# Patient Record
Sex: MALE | Race: WHITE | URBAN - METROPOLITAN AREA
[De-identification: names, ages, dates, MRNs, and addresses within clinical notes are randomized per-mention and may not be internally consistent; named-entity substitution may affect disease eponyms.]

---

## 2017-08-16 ENCOUNTER — OFFICE VISIT (OUTPATIENT)
Dept: FAMILY MEDICINE | Facility: CLINIC | Age: 32
End: 2017-08-16
Payer: COMMERCIAL

## 2017-08-16 VITALS — TEMPERATURE: 98.1 F | DIASTOLIC BLOOD PRESSURE: 60 MMHG | SYSTOLIC BLOOD PRESSURE: 100 MMHG

## 2017-08-16 DIAGNOSIS — Z71.84 TRAVEL ADVICE ENCOUNTER: Primary | ICD-10-CM

## 2017-08-16 DIAGNOSIS — Z23 NEED FOR VACCINATION: ICD-10-CM

## 2017-08-16 PROCEDURE — 90471 IMMUNIZATION ADMIN: CPT | Mod: GA | Performed by: NURSE PRACTITIONER

## 2017-08-16 PROCEDURE — 90632 HEPA VACCINE ADULT IM: CPT | Mod: GA | Performed by: NURSE PRACTITIONER

## 2017-08-16 PROCEDURE — 99402 PREV MED CNSL INDIV APPRX 30: CPT | Mod: 25 | Performed by: NURSE PRACTITIONER

## 2017-08-16 PROCEDURE — 90472 IMMUNIZATION ADMIN EACH ADD: CPT | Mod: GA | Performed by: NURSE PRACTITIONER

## 2017-08-16 PROCEDURE — 90717 YELLOW FEVER VACCINE SUBQ: CPT | Mod: GA | Performed by: NURSE PRACTITIONER

## 2017-08-16 RX ORDER — AZITHROMYCIN 500 MG/1
500 TABLET, FILM COATED ORAL DAILY
Qty: 3 TABLET | Refills: 0 | Status: SHIPPED | OUTPATIENT
Start: 2017-08-16 | End: 2017-08-19

## 2017-08-16 RX ORDER — ACETAZOLAMIDE 125 MG/1
TABLET ORAL
Qty: 20 TABLET | Refills: 0 | Status: SHIPPED | OUTPATIENT
Start: 2017-08-16

## 2017-08-16 RX ORDER — ATOVAQUONE AND PROGUANIL HYDROCHLORIDE 250; 100 MG/1; MG/1
1 TABLET, FILM COATED ORAL DAILY
Qty: 70 TABLET | Refills: 0 | Status: SHIPPED | OUTPATIENT
Start: 2017-08-16

## 2017-08-16 NOTE — PATIENT INSTRUCTIONS
Today August 16, 2017 you received the    Hepatitis A Vaccine -    Yellow Fever (YF)    Typhoid - Oral. This prescription has been faxed to your pharmacy, please take as directed.   .    These appointments can be made as a NURSE ONLY visit.    **It is very important for the vaccinations to be given on the scheduled day(s), this helps ensure you receive the full effectiveness of the vaccine.**    Please call [facility] with any questions [phone]    Thank you for visiting [facility]

## 2017-08-16 NOTE — NURSING NOTE
"Chief Complaint   Patient presents with     Travel Clinic     initial /60  Temp 98.1  F (36.7  C) (Oral) Estimated body mass index is 22.54 kg/(m^2) as calculated from the following:    Height as of 12/27/16: 5' 7.25\" (1.708 m).    Weight as of 12/27/16: 145 lb (65.8 kg).  BP completed using cuff size: regular.  L  arm      Health Maintenance that is potentially due pending provider review:  NONE    n/a    Logan Roman ma  "

## 2017-08-16 NOTE — MR AVS SNAPSHOT
After Visit Summary   8/16/2017    Edgard Morales    MRN: [id number]           Patient Information     Date Of Birth          1985        Visit Information        Provider Department      8/16/2017 12:00 PM Zuly Sanchez APRN [facility]        Today's Diagnoses     Travel advice encounter    -  1    Need for vaccination          Care Instructions    Today August 16, 2017 you received the    Hepatitis A Vaccine -    Yellow Fever (YF)    Typhoid - Oral. This prescription has been faxed to your pharmacy, please take as directed.   .    These appointments can be made as a NURSE ONLY visit.    **It is very important for the vaccinations to be given on the scheduled day(s), this helps ensure you receive the full effectiveness of the vaccine.**    Please call [facility] with any questions [phone]    Thank you for visiting [facility]              Follow-ups after your visit        Who to contact     If you have questions or need follow up information about today's clinic visit or your schedule please contact [facility] directly at [phone].  Normal or non-critical lab and imaging results will be communicated to you by MyChart, letter or phone within 4 business days after the clinic has received the results. If you do not hear from us within 7 days, please contact the clinic through [employer] or phone. If you have a critical or abnormal lab result, we will notify you by phone as soon as possible.  Submit refill requests through [employer] or call your pharmacy and they will forward the refill request to us. Please allow 3 business days for your refill to be completed.          Additional Information About Your Visit        MyChart Information     [employer] lets you send messages to your doctor, view your test results, renew your prescriptions, schedule appointments and more. To sign up, go to [email or web address] . Click on  "\"Log in\" on the left side of the screen, which will take you to the Welcome page. Then click on \"Sign up Now\" on the right side of the page.     You will be asked to enter the access code listed below, as well as some personal information. Please follow the directions to create your username and password.     Your access code is: [email or web address]  Expires: 2017 12:39 PM     Your access code will  in 90 days. If you need help or a new code, please call your [place] clinic or [phone].        Care EveryWhere ID     This is your Care EveryWhere ID. This could be used by other organizations to access your [place] medical records  [id number]        Your Vitals Were     Temperature                   98.1  F (36.7  C) (Oral)            Blood Pressure from Last 3 Encounters:   17 100/60   16 90/64   05/29/15 100/56    Weight from Last 3 Encounters:   16 145 lb (65.8 kg)   05/29/15 147 lb 6.4 oz (66.9 kg)              We Performed the Following     C YELLOW FEVER IMMUNIZATION, LIVE, SQ (STAMARIL)     HEPA VACCINE ADULT IM          Today's Medication Changes          These changes are accurate as of: 17 12:45 PM.  If you have any questions, ask your nurse or doctor.               Start taking these medicines.        Dose/Directions    acetaZOLAMIDE 125 MG tablet   Commonly known as:  DIAMOX   Used for:  Travel advice encounter   Started by:  Zuly Sanchez APRN CNP        To prevent altitude illness: Take one tab every 12 hours starting 24 hours prior to ascent and continue for 48 hours at highest   Quantity:  20 tablet   Refills:  0       atovaquone-proguanil 250-100 MG per tablet   Commonly known as:  MALARONE   Used for:  Travel advice encounter   Started by:  Zuly Sanchez APRN CNP        Dose:  1 tablet   Take 1 tablet by mouth daily Start 2 days before exposure to Malaria and continue daily till  7 days after exposure.   Quantity:  70 tablet   Refills:  0       azithromycin " 500 MG tablet   Commonly known as:  ZITHROMAX   Used for:  Travel advice encounter   Started by:  Zuly Sanchez APRN CNP        Dose:  500 mg   Take 1 tablet (500 mg) by mouth daily for 3 doses Take 1 tablet a day for up to 3 days for severe diarrhea   Quantity:  3 tablet   Refills:  0       typhoid CR capsule   Commonly known as:  VIVOTIF   Used for:  Travel advice encounter   Started by:  Zuly Sanchez APRN CNP        Dose:  1 capsule   Take 1 capsule by mouth every other day   Quantity:  4 capsule   Refills:  0            Where to get your medicines      These medications were sent to [id number] - [place], [place] - [address]  [address], [facility] [address]     Phone:  [phone]     acetaZOLAMIDE 125 MG tablet    atovaquone-proguanil 250-100 MG per tablet    azithromycin 500 MG tablet    typhoid CR capsule                Primary Care Provider    None       No address on file        Equal Access to Services     [facility] : Hadii sadie kingsleyo Jaqueline, waaxda luqadaha, qaybta kaalmada adephillipyacandelario, rosalina shah . So [facility] [phone].    ATENCIÓN: Si habla español, tiene a reina disposición servicios gratuitos de asistencia lingüística. Radha al [phone].    We comply with applicable federal civil rights laws and [place] laws. We do not discriminate on the basis of race, color, national origin, age, disability sex, sexual orientation or gender identity.            Thank you!     Thank you for choosing [facility]  for your care. Our goal is always to provide you with excellent care. Hearing back from our patients is one way we can continue to improve our services. Please take a few minutes to complete the written survey that you may receive in the mail after your visit with us. Thank you!             Your Updated Medication List - Protect others around you: Learn how to safely use, store and throw away your medicines at  www.disposemymeds.org.          This list is accurate as of: 8/16/17 12:45 PM.  Always use your most recent med list.                   Brand Name Dispense Instructions for use Diagnosis    acetaZOLAMIDE 125 MG tablet    DIAMOX    20 tablet    To prevent altitude illness: Take one tab every 12 hours starting 24 hours prior to ascent and continue for 48 hours at highest    Travel advice encounter       atovaquone-proguanil 250-100 MG per tablet    MALARONE    70 tablet    Take 1 tablet by mouth daily Start 2 days before exposure to Malaria and continue daily till  7 days after exposure.    Travel advice encounter       azithromycin 500 MG tablet    ZITHROMAX    3 tablet    Take 1 tablet (500 mg) by mouth daily for 3 doses Take 1 tablet a day for up to 3 days for severe diarrhea    Travel advice encounter       CABERGOLINE PO      Take 2 mg by mouth twice a week        ciprofloxacin 500 MG tablet    CIPRO    6 tablet    Take 1 tablet (500 mg) by mouth 2 times daily    Encounter for routine adult medical exam with abnormal findings       IBUPROFEN PO      Take 200 mg by mouth as needed for moderate pain        MULTIVITAMIN PO      Take 1 tablet by mouth daily        triamcinolone 0.1 % cream    KENALOG    15 g    Apply sparingly to affected area three times daily for 14 days.    Eczema       typhoid CR capsule    VIVOTIF    4 capsule    Take 1 capsule by mouth every other day    Travel advice encounter

## 2017-08-16 NOTE — PROGRESS NOTES
Nurse Note      Itinerary:  [place]      Departure Date: 9/15/17      Return Date: 11/15/17      Length of Trip 2 months      Reason for Travel: Tourism           Urban or rural: both      Accommodations: Hotel        IMMUNIZATION HISTORY  Have you received any immunizations within the past 4 weeks?  No  Have you ever fainted from having your blood drawn or from an injection?  No  Have you ever had a fever reaction to vaccination?  No  Have you ever had any bad reaction or side effect from any vaccination?  No  Have you ever had hepatitis A or B vaccine?  No  Do you live (or work closely) with anyone who has AIDS, an AIDS-like condition, any other immune disorder or who is on chemotherapy for cancer?  No  Do you have a family history of immunodeficiency?  No  Have you received any injection of immune globulin or any blood products during the past 12 months?  No    Patient roomed by Logan Monroy  Edgard Morales is a 31 year old male seen today alone for counsultation for [place] travel [place].  Patient will be departing in  1 month(s) and staying for   2 month(s) and  traveling with spouse.      Patient itinerary :  Open Itinerary: will begin in [place] and drive/bus south through [place] including, [place], [place], [place], [place], [place], [place] and [place]  of  which presents risk for Malaria, Yellow Fever, Dengue Fever, Chikungungya, Zika,  Trypanosomiasis, Schistosomiasis, Rabies, food borne illnesses, motor vehicle accidents, Typhoid, Leishmaniasis and zika. exposure.      Patient's activities will include sightseeing, camping, hiking, beach activities (salt water), scuba diving, high altitude exposure and travel by car or other vehicle.    Patient's country of birth is [place]    Special medical concerns: none  Pre-travel questionnaire was completed by patient and reviewed by provider.     Vitals: /60  Temp  98.1  F (36.7  C) (Oral)  BMI= There is no height or weight on file to calculate BMI.    EXAM:  General:  Well-nourished, well-developed in no acute distress.  Appears to be stated age, interacts appropriately and expresses understanding of information given to patient.    Current Outpatient Prescriptions   Medication Sig Dispense Refill     typhoid (VIVOTIF) CR capsule Take 1 capsule by mouth every other day 4 capsule 0     atovaquone-proguanil (MALARONE) 250-100 MG per tablet Take 1 tablet by mouth daily Start 2 days before exposure to Malaria and continue daily till  7 days after exposure. 70 tablet 0     azithromycin (ZITHROMAX) 500 MG tablet Take 1 tablet (500 mg) by mouth daily for 3 doses Take 1 tablet a day for up to 3 days for severe diarrhea 3 tablet 0     acetaZOLAMIDE (DIAMOX) 125 MG tablet To prevent altitude illness: Take one tab every 12 hours starting 24 hours prior to ascent and continue for 48 hours at highest 20 tablet 0     ciprofloxacin (CIPRO) 500 MG tablet Take 1 tablet (500 mg) by mouth 2 times daily 6 tablet 0     CABERGOLINE PO Take 2 mg by mouth twice a week       IBUPROFEN PO Take 200 mg by mouth as needed for moderate pain       Multiple Vitamins-Minerals (MULTIVITAMIN PO) Take 1 tablet by mouth daily       triamcinolone (KENALOG) 0.1 % cream Apply sparingly to affected area three times daily for 14 days. 15 g 0     Patient Active Problem List   Diagnosis     Prolactinoma (H)     Allergies   Allergen Reactions     Tylenol W/Codeine [Acetaminophen-Codeine]          Immunizations discussed include:   Hepatitis A:  Ordered/given today, risks, benefits and side effects reviewed  Hepatitis B: advised patient to check records  Influenza: vaccine is not available  Typhoid: Oral Typhoid (Vivotiff) Rx sent to pharmacy  Rabies: Declined  Cost  reviewed managment of a animal bite or scratch (washing wound, seek medical care within 24 hours for post exposure prophylaxis )  Yellow Fever: Stamaril  Ordered/given today - consent completed, side effects, precautions, allergies, risks discussed. Patient expressed understanding.  Palestinian Encephalitis: Not indicated  Meningococcus: Not indicated  Tetanus/Diphtheria: Up to date  Measles/Mumps/Rubella: Up to date  Cholera: Not needed  Polio: Up to date  Pneumococcal: Under age of 65  Varicella: Immune by disease history per patient report  Zostavax:  Not indicated  HPV:  Not indicated  TB:  Low risk    Stamaril Informed Consent    The patient was provided with a copy of the IRB-approved consent form and all questions were answered before the patient agreed to participate by signing the informed consent document.   A copy of the form was provided to the patient.    Date: 08/16/2017  Consent Version Date: 05/10/2017  Consent Obtained by:  KRISTY Sanchez NO  HIPAA:  Yes  HIPAA Authorization Signed Date: 08/16/2017      Inclusion/Exclusion Criteria:    (Similar to Yellow Fever-VAX)      The patient met all of the following inclusion criteria in order to be eligible for the Stamaril vaccination under this EAP (Expanded Access Investigational New Drug Program)           At increased risk for YF, including researchers, laboratory workers, vaccine production staff, and those who are traveling within 30 days to a YF-endemic region or to a country requiring proof of YF vaccination under IHRs (International Health Regulations)?       Yes     Patient is greater than or equal to 9 months of age on the day of vaccination?     Yes     Patient is greater than or equal to 18 years of age and signed and dated the Consent Forms?     Yes     Patient is < 18 years of age and parent(s)/guardian(s) signed and dated the Consent Forms?      Patient is 7 years to < 18 years of age and signed and dated the Assent form?        No Assent is required.  Patient is <7 years of age.     no      No      N/A     The patient did not meet any of the following criteria that would have excluded the patient from  receiving the Stamaril vaccination under this EAP              Patient is less than 9 months of age.       No     The patient is breast-feeding and cannot stop nursing for at least 14 days after vaccination.    Note: Yellow Fever vaccine virus may be transmissible via breast milk by nursing mothers who are vaccinated during the final 2 weeks of pregnancy or post-partum.   Following transmission, infants may develop encephalitis.  The minimum time of discontinuation of breastfeeding for 14 days after vaccination is based on the expected clearance of live-attenuated vaccine virus.       No     The patient is immunosuppressed, whether congenital or idiopathic, including for example, leukemia, lymphoma, other malignancies, and patients who are receiving immunosuppressant medications (e.g. Systemic corticosteroids [greater than the standard dose of topical or inhaled steroids], alkylating drugs, antimetabolites, of other cytotoxic or immunomodulatory drugs) or radiation therapy or organ transplantation.       No     The patient has known hypersensitivity to the active substance or to any of the excipients of Stamaril vaccine or to eggs or chicken proteins.     No     The patient is symptomatic for human immunodeficiency virus (HIV) infection     No     The patient is asymptomatic for HIV infection but accompanied by evidence of severe immune suppression    Note:  Evidence of severe immune suppression includes CD4+ T-cell counts < 200 cubic millimeters (or < 15% total lymphocytes in children aged < 6 years), or as determined by the health care provider.       No     The patient has a history of thymus dysfunction (including myasthenia gravis, thymoma, thymectomy)     No     Moderate or severe febrile illness or acute illness    Note: Participation in the EAP can be reassessed when moderate or severe febrile illness or acute illness has resolved.       No           Altitude Exposure on this trip: YES  Past tolerance to  Altitude: has tolerated up to 9 000    ASSESSMENT/PLAN:    ICD-10-CM    1. Travel advice encounter Z71.89 HEPA VACCINE ADULT IM     typhoid (VIVOTIF) CR capsule     C YELLOW FEVER IMMUNIZATION, LIVE, SQ (STAMARIL)     atovaquone-proguanil (MALARONE) 250-100 MG per tablet     azithromycin (ZITHROMAX) 500 MG tablet     acetaZOLAMIDE (DIAMOX) 125 MG tablet     DISCONTINUED: typhoid (VIVOTIF) CR capsule     DISCONTINUED: typhoid (VIVOTIF) CR capsule   2. Need for vaccination Z23 HEPA VACCINE ADULT IM     C YELLOW FEVER IMMUNIZATION, LIVE, SQ (STAMARIL)     I have reviewed general recommendations for safe travel   including: food/water precautions, insect precautions, safer sex   practices given high prevalence of Zika, HIV and other STDs,   roadway safety. Educational materials and [employer] report provided.    Malaraia prophylaxis recommended: Malarone  Symptomatic treatment for traveler's diarrhea: azithromycin  Altitude illness prevention and treatment: Diamox with patient education      Evacuation insurance advised and resources were provided to patient.    Total visit time 30 minutes  with over 50% of time spent counseling patient as detailed above.    Zuly Sanchez CNP

## 2021-08-16 ENCOUNTER — LAB REQUISITION (OUTPATIENT)
Dept: LAB | Facility: CLINIC | Age: 36
End: 2021-08-16

## 2021-08-16 DIAGNOSIS — D35.2 BENIGN NEOPLASM OF PITUITARY GLAND (H): ICD-10-CM

## 2021-08-16 LAB — PROLACTIN SERPL-MCNC: 21.5 NG/ML (ref 0–15)

## 2021-08-16 PROCEDURE — 36415 COLL VENOUS BLD VENIPUNCTURE: CPT | Mod: ORL | Performed by: INTERNAL MEDICINE

## 2021-08-16 PROCEDURE — 84146 ASSAY OF PROLACTIN: CPT | Mod: ORL | Performed by: INTERNAL MEDICINE

## 2021-11-28 ENCOUNTER — HEALTH MAINTENANCE LETTER (OUTPATIENT)
Age: 36
End: 2021-11-28

## 2021-12-03 ENCOUNTER — LAB (OUTPATIENT)
Dept: LAB | Facility: CLINIC | Age: 36
End: 2021-12-03
Payer: OTHER GOVERNMENT

## 2021-12-03 DIAGNOSIS — D35.2 PROLACTINOMA (H): Primary | ICD-10-CM

## 2021-12-03 LAB — PROLACTIN SERPL-MCNC: 13.2 NG/ML (ref 0–15)

## 2021-12-03 PROCEDURE — 36415 COLL VENOUS BLD VENIPUNCTURE: CPT

## 2021-12-03 PROCEDURE — 84146 ASSAY OF PROLACTIN: CPT

## 2022-09-11 ENCOUNTER — HEALTH MAINTENANCE LETTER (OUTPATIENT)
Age: 37
End: 2022-09-11

## 2023-01-22 ENCOUNTER — HEALTH MAINTENANCE LETTER (OUTPATIENT)
Age: 38
End: 2023-01-22

## 2024-02-18 ENCOUNTER — HEALTH MAINTENANCE LETTER (OUTPATIENT)
Age: 39
End: 2024-02-18